# Patient Record
Sex: MALE | Race: WHITE
[De-identification: names, ages, dates, MRNs, and addresses within clinical notes are randomized per-mention and may not be internally consistent; named-entity substitution may affect disease eponyms.]

---

## 2020-03-02 ENCOUNTER — HOSPITAL ENCOUNTER (INPATIENT)
Dept: HOSPITAL 74 - FM/S | Age: 65
LOS: 2 days | Discharge: HOME HEALTH | DRG: 470 | End: 2020-03-04
Attending: ORTHOPAEDIC SURGERY | Admitting: ORTHOPAEDIC SURGERY
Payer: COMMERCIAL

## 2020-03-02 VITALS — BODY MASS INDEX: 36.9 KG/M2

## 2020-03-02 DIAGNOSIS — I10: ICD-10-CM

## 2020-03-02 DIAGNOSIS — R26.2: ICD-10-CM

## 2020-03-02 DIAGNOSIS — M17.11: Primary | ICD-10-CM

## 2020-03-02 PROCEDURE — 8E0Y0CZ ROBOTIC ASSISTED PROCEDURE OF LOWER EXTREMITY, OPEN APPROACH: ICD-10-PCS | Performed by: ORTHOPAEDIC SURGERY

## 2020-03-02 PROCEDURE — 0SRC0JA REPLACEMENT OF RIGHT KNEE JOINT WITH SYNTHETIC SUBSTITUTE, UNCEMENTED, OPEN APPROACH: ICD-10-PCS | Performed by: ORTHOPAEDIC SURGERY

## 2020-03-02 RX ADMIN — DOCUSATE SODIUM,SENNOSIDES SCH TABLET: 50; 8.6 TABLET, FILM COATED ORAL at 21:13

## 2020-03-02 RX ADMIN — OXYCODONE HYDROCHLORIDE AND ACETAMINOPHEN SCH MG: 500 TABLET ORAL at 21:13

## 2020-03-02 RX ADMIN — GABAPENTIN SCH MG: 300 CAPSULE ORAL at 21:13

## 2020-03-02 RX ADMIN — CEFAZOLIN SODIUM SCH MLS/HR: 2 SOLUTION INTRAVENOUS at 20:05

## 2020-03-02 RX ADMIN — CELECOXIB SCH MG: 200 CAPSULE ORAL at 21:13

## 2020-03-02 NOTE — HP
Admitting History and Physical





- Admission


Chief Complaint: right knee osteoarthritis x years


History of Present Illness: 


64 year old male presents in regard to their right knee. Long-standing history 

of right knee osteoarthritis. Patient complains of pain, limited range of motion

, difficulty ambulating, and difficulty with activities of daily living. 

Patient has failed conservative treatment options including PO medications, 

activity modification, injections, and exercise programs. At this point, 

patient like to proceed with surgical intervention, right total knee 

arthroplasty MAKOplasty. 


History Source: Patient





- Past Medical History


Cardiovascular: Yes: HTN


Musculoskeletal: Yes: Osteoarthritis





- Past Surgical History


Additional Past Surgical History: 


See written history & physical. 








- Smoking History


Smoking history: Never smoked


Have you smoked in the past 12 months: No





- Alcohol/Substance Use


Hx Alcohol Use: Yes (SOCIAL)





Home Medications





- Allergies


Allergies/Adverse Reactions: 


 Allergies











Allergy/AdvReac Type Severity Reaction Status Date / Time


 


No Known Allergies Allergy   Verified 02/24/20 16:49














- Home Medications


Home Medications: 


Ambulatory Orders





Fish Oil 1,000 mg Capsule 1 tab PO DAILY 02/24/20 


Spironolactone 25 mg PO DAILY 02/24/20 


Valsartan 320 mg PO DAILY 02/24/20 











Review of Systems





- Review of Systems


Musculoskeletal: reports: Crepitus (right knee), Decreased ROM (right knee), 

Joint Pain (right knee), Joint Swelling (right knee)





Physical Examination


Constitutional: Yes: Well Nourished


Eyes: Yes: Conjunctiva Clear


HENT: Yes: Atraumatic


Neck: Yes: Supple


Cardiovascular: Yes: Regular Rate and Rhythm


Respiratory: Yes: Regular


Gastrointestinal: Yes: Soft


...Rectal Exam: Yes: Deferred


Musculoskeletal: Yes: Joint Stiffness (right knee), Joint Swelling (right knee)





Assessment/Plan


64 year male old presents in regard to their right knee. Long-standing history 

of right knee osteoarthritis. Patient complains of pain, limited range of motion

, difficulty ambulating, and difficulty with activities of daily living. 

Patient has failed conservative treatment options including PO medications, 

activity modification, injections, and exercise programs. At this point, 

patient like to proceed with surgical intervention, right total knee 

arthroplasty MAKOplasty. Pros, cons, risks, benefits, and alternatives of a 

right total knee arthroplasty MAKOplasty were discussed with the patient at 

length. Patient confirms their understanding and consents to proceed with a 

right total knee arthroplasty MAKOplasty.

## 2020-03-02 NOTE — OP
Operative Note





- Note:


Operative Date: 03/02/20


Pre-Operative Diagnosis: right knee OA


Operation: right ASHLEY TKA


Post-Operative Diagnosis: Same as Pre-op


Surgeon: Wilmer Metz


Assistant: Lin Monahan


Anesthesia: Spinal


Estimated Blood Loss (mls): 200

## 2020-03-03 LAB
ANION GAP SERPL CALC-SCNC: 9 MMOL/L (ref 8–16)
BUN SERPL-MCNC: 23 MG/DL (ref 7–18)
CALCIUM SERPL-MCNC: 8.6 MG/DL (ref 8.5–10)
CHLORIDE SERPL-SCNC: 100 MMOL/L (ref 98–107)
CO2 SERPL-SCNC: 25 MMOL/L (ref 21–32)
CREAT SERPL-MCNC: 1.2 MG/DL (ref 0.55–1.3)
DEPRECATED RDW RBC AUTO: 13 % (ref 11.9–15.9)
GLUCOSE SERPL-MCNC: 179 MG/DL (ref 74–106)
HCT VFR BLD CALC: 45.1 % (ref 35.4–49)
HGB BLD-MCNC: 14.8 GM/DL (ref 11.7–16.9)
MCH RBC QN AUTO: 30.4 PG (ref 25.7–33.7)
MCHC RBC AUTO-ENTMCNC: 32.9 G/DL (ref 32–35.9)
MCV RBC: 92.4 FL (ref 80–96)
PLATELET # BLD AUTO: 215 K/MM3 (ref 134–434)
PMV BLD: 10.6 FL (ref 7.5–11.1)
POTASSIUM SERPLBLD-SCNC: 3.8 MMOL/L (ref 3.5–5.1)
RBC # BLD AUTO: 4.88 M/MM3 (ref 4–5.6)
SODIUM SERPL-SCNC: 134 MMOL/L (ref 136–145)
WBC # BLD AUTO: 16.2 K/MM3 (ref 4–10)

## 2020-03-03 RX ADMIN — KETOROLAC TROMETHAMINE SCH MG: 30 INJECTION INTRAMUSCULAR; INTRAVENOUS at 12:10

## 2020-03-03 RX ADMIN — PANTOPRAZOLE SODIUM SCH MG: 40 TABLET, DELAYED RELEASE ORAL at 10:34

## 2020-03-03 RX ADMIN — ACETAMINOPHEN SCH MG: 325 TABLET ORAL at 18:20

## 2020-03-03 RX ADMIN — MULTIVITAMIN TABLET SCH TAB: TABLET at 10:34

## 2020-03-03 RX ADMIN — ACETAMINOPHEN SCH MG: 325 TABLET ORAL at 06:31

## 2020-03-03 RX ADMIN — KETOROLAC TROMETHAMINE SCH MG: 30 INJECTION INTRAMUSCULAR; INTRAVENOUS at 06:32

## 2020-03-03 RX ADMIN — DOCUSATE SODIUM,SENNOSIDES SCH TABLET: 50; 8.6 TABLET, FILM COATED ORAL at 10:34

## 2020-03-03 RX ADMIN — KETOROLAC TROMETHAMINE SCH MG: 30 INJECTION INTRAMUSCULAR; INTRAVENOUS at 18:25

## 2020-03-03 RX ADMIN — ACETAMINOPHEN SCH MG: 325 TABLET ORAL at 12:05

## 2020-03-03 RX ADMIN — OXYCODONE HYDROCHLORIDE AND ACETAMINOPHEN SCH MG: 500 TABLET ORAL at 10:35

## 2020-03-03 RX ADMIN — CELECOXIB SCH MG: 200 CAPSULE ORAL at 21:17

## 2020-03-03 RX ADMIN — CELECOXIB SCH MG: 200 CAPSULE ORAL at 10:34

## 2020-03-03 RX ADMIN — CEFAZOLIN SODIUM SCH MLS/HR: 2 SOLUTION INTRAVENOUS at 03:09

## 2020-03-03 RX ADMIN — GABAPENTIN SCH MG: 300 CAPSULE ORAL at 10:34

## 2020-03-03 RX ADMIN — DOCUSATE SODIUM,SENNOSIDES SCH TABLET: 50; 8.6 TABLET, FILM COATED ORAL at 21:17

## 2020-03-03 RX ADMIN — ACETAMINOPHEN SCH MG: 325 TABLET ORAL at 00:48

## 2020-03-03 RX ADMIN — GABAPENTIN SCH MG: 300 CAPSULE ORAL at 21:17

## 2020-03-03 RX ADMIN — KETOROLAC TROMETHAMINE SCH MG: 30 INJECTION INTRAMUSCULAR; INTRAVENOUS at 00:47

## 2020-03-03 RX ADMIN — OXYCODONE HYDROCHLORIDE AND ACETAMINOPHEN SCH MG: 500 TABLET ORAL at 21:17

## 2020-03-03 RX ADMIN — ASPIRIN 325 MG ORAL TABLET SCH MG: 325 PILL ORAL at 08:05

## 2020-03-03 NOTE — PN
Progress Note (short form)





- Note


Progress Note: 





Pt seen and examined.  Doing well.





AVSS





                                Selected Entries











  03/03/20





  14:17


 


Temperature 97.4 F L


 


Respiratory 19





Rate 


 


Blood Pressure 126/77


 


O2 Sat by Pulse 98





Oximetry (%) 








                                Laboratory Tests











  03/03/20 03/03/20





  06:45 06:45


 


WBC  16.2 H 


 


Hgb  14.8 


 


Hct  45.1 


 


Plt Count  215 


 


Sodium   134 L


 


Potassium   3.8


 


Chloride   100


 


Carbon Dioxide   25


 


Anion Gap   9


 


BUN   23.0 H


 


Creatinine   1.2


 


Est GFR (CKD-EPI)AfAm   74.66


 


Est GFR (CKD-EPI)NonAf   64.42


 


Random Glucose   179 H


 


Calcium   8.6











Gen: NAD


RLE: c/d/i, NVID





A/P POD#1 s/p R ASHLEY HUNTLEY





PT/OOB





D/C home in AM

## 2020-03-03 NOTE — SURG
Surgery First Assist Note


First Assist: Lin Monahan PA-C


Date of Service: 03/02/20


Diagnosis: 


right knee osteoarthritis


Procedure: 





right total knee arthroplasty, Makoplasty


I was present for the entirety of the operative procedure. For further detail, 

please refer to operative report.

## 2020-03-03 NOTE — DS
Physical Examination


Vital Signs: 


                                   Vital Signs











Temperature  97.4 F L  03/03/20 14:17


 


Pulse Rate  80   03/03/20 04:00


 


Respiratory Rate  19   03/03/20 14:17


 


Blood Pressure  126/77   03/03/20 14:17


 


O2 Sat by Pulse Oximetry (%)  98   03/03/20 14:17











Labs: 


                                    CBC, BMP





                                 03/03/20 06:45 





                                 03/03/20 06:45 











Discharge Summary


Problems reviewed: Yes


Reason For Visit: RIGHT KNEE OSTEOARTHRITIS


Current Active Problems





Osteoarthritis of right knee (Acute)








Procedures: Principal: right ASHLEY TKA


Hospital Course: 


Admitted for elective surgery. Procedure performed without complications. 


Pt received postoperative antibiotic prophylaxis and DVT ppx.


Ambulated with physical therapy. Stable for discharge home with outpatient 

followup.


Condition: Stable





- Instructions


Diet, Activity, Other Instructions: 


Dr. Metz - Knee Replacement Instructions





Keep the Aquacel dressing on until removed by Dr. Metz in the office - 


it is antibacterial and waterproof and you can shower with it on.





Call the office for a follow-up appointment with Dr. Metz in 2 weeks.  [phone]





Take one Aspirin 325mg daily for 6 weeks to prevent blood clots in your legs.





Take one Pantoprazole 40mg daily for 6 weeks to protect against heartburn and 

ulcers.





Take Cephalexin (antibiotic) 3x/day for 10 days to help prevent skin infection.





Take Celebrex 200mg twice daily for 30 days to reduce swelling and inflammation.





Take a multivitamin, stool softener, and extra Vitamin C supplement daily.





For pain:





*Mild pain (1-3/10):





Take 1 Tramadol tablet every 4 hours as needed.





**Moderate pain (4-6/10):





Take 1 Tramadol tablet and 1 Percocet tablet every 4 hours as needed.





*** Severe pain (7-10/10):





Take 1 Tramadol tablet and 2 Percocet tablets every 4 hours as needed.





Activity: 


You can put as much weight on the operative leg as you want.





Right after you get home, there will be a physical therapist coming to your 


house to help you walk around and bend/straighten your knee.


 


After your follow-up appointment, you will be sent for more intensive outpatient




physical therapy which will include machines and equipment that the home 

therapist 


cannot bring to your house.





Always use a walker or cane for balance and to prevent falls.





Expect to see swelling/bruising from the operative site all the way down to your

toes. 


 


Wear the compression stocking on the operative side during the day to minimize 


how much swelling there is in your foot/ankle.





Don't wear the stocking at night.  You don't have to wear a stocking on the 

other side.


Disposition: VNS/HOME HEALTH CARE





- Home Medications


Comprehensive Discharge Medication List: 


Ambulatory Orders





Omega-3 Fatty Acids/Fish Oil [Fish Oil 1,000 mg Capsule] 1 each PO DAILY 

02/24/20 


Spironolactone 25 mg PO DAILY 02/24/20 


Valsartan 320 mg PO DAILY 02/24/20 


Ascorbic Acid [Vitamin C -] 500 mg PO BID  tablet 03/03/20 


Aspirin [ASA -] 325 mg PO DAILY@0800  tablet 03/03/20 


Celecoxib [CeleBREX -] 200 mg PO BID #60 capsule 03/03/20 


Cephalexin Monohydrate [Keflex -] 500 mg PO TID #30 capsule 03/03/20 


Multivitamins [Multivit ([facility] Formulary)] 1 tab PO DAILY  tab 03/03/20 


Oxycodone HCl/Acetaminophen [Percocet 5-325 mg Tablet] 1 - 2 tab PO Q4H PRN #60 

tablet MDD 10 03/03/20 


Pantoprazole Sodium [Protonix -] 40 mg PO DAILY #40 tablet.ec 03/03/20 


Sennosides/Docusate Sodium [Pericolace -] 2 tablet PO BID  tablet 03/03/20 


traMADol HCL [Ultram -] 50 mg PO Q4H PRN #42 tablet MDD 6 03/03/20

## 2020-03-03 NOTE — PN
Progress Note (short form)





- Note


Progress Note: 





Anesthesia postop note





65 y/o M s/p spinal anesthesia and regional block for pain management, for TKR


POD#1, vss, aaox3, pain well controlled, no complaints, had PT today.


No anesthesia complications.

## 2020-03-04 VITALS — TEMPERATURE: 97.6 F | DIASTOLIC BLOOD PRESSURE: 66 MMHG | SYSTOLIC BLOOD PRESSURE: 136 MMHG | HEART RATE: 82 BPM

## 2020-03-04 LAB
DEPRECATED RDW RBC AUTO: 12.4 % (ref 11.9–15.9)
HCT VFR BLD CALC: 37.7 % (ref 35.4–49)
HGB BLD-MCNC: 12.6 GM/DL (ref 11.7–16.9)
MCH RBC QN AUTO: 30.9 PG (ref 25.7–33.7)
MCHC RBC AUTO-ENTMCNC: 33.6 G/DL (ref 32–35.9)
MCV RBC: 92 FL (ref 80–96)
PLATELET # BLD AUTO: 159 K/MM3 (ref 134–434)
PMV BLD: 10.1 FL (ref 7.5–11.1)
RBC # BLD AUTO: 4.1 M/MM3 (ref 4–5.6)
WBC # BLD AUTO: 15.8 K/MM3 (ref 4–10.8)

## 2020-03-04 RX ADMIN — SODIUM CHLORIDE, POTASSIUM CHLORIDE, SODIUM LACTATE AND CALCIUM CHLORIDE SCH: 600; 310; 30; 20 INJECTION, SOLUTION INTRAVENOUS at 07:51

## 2020-03-04 RX ADMIN — DOCUSATE SODIUM,SENNOSIDES SCH TABLET: 50; 8.6 TABLET, FILM COATED ORAL at 09:12

## 2020-03-04 RX ADMIN — GABAPENTIN SCH MG: 300 CAPSULE ORAL at 09:11

## 2020-03-04 RX ADMIN — ACETAMINOPHEN SCH MG: 325 TABLET ORAL at 06:15

## 2020-03-04 RX ADMIN — CELECOXIB SCH MG: 200 CAPSULE ORAL at 09:11

## 2020-03-04 RX ADMIN — ASPIRIN 325 MG ORAL TABLET SCH MG: 325 PILL ORAL at 08:16

## 2020-03-04 RX ADMIN — OXYCODONE HYDROCHLORIDE AND ACETAMINOPHEN SCH MG: 500 TABLET ORAL at 09:11

## 2020-03-04 RX ADMIN — ACETAMINOPHEN SCH MG: 325 TABLET ORAL at 00:04

## 2020-03-04 RX ADMIN — PANTOPRAZOLE SODIUM SCH MG: 40 TABLET, DELAYED RELEASE ORAL at 09:11

## 2020-03-04 RX ADMIN — MULTIVITAMIN TABLET SCH TAB: TABLET at 09:11

## 2020-03-04 NOTE — SPEC
DATE OF OPERATION:  03/02/2020

 

PREOPERATIVE DIAGNOSIS:  Right knee osteoarthritis.  

 

POSTOPERATIVE DIAGNOSIS:  Right knee osteoarthritis.  

 

PROCEDURE:  Right total knee replacement with MAKOplasty robotic navigation.  

 

ATTENDING:  Iram Lomax MD

 

ASSISTANT:  SHIVAM Rivera

 

ANESTHESIA:  Spinal plus sedation.  

 

ESTIMATED BLOOD LOSS:  200 mL 

 

COMPLICATIONS:  None.

 

DISPOSITION:  The patient was transferred to the PACU in stable condition.  

 

IMPLANTS USED:  [employer] cementless knee components size 5 femur, size 6

tibia, 35 mm patellar component, 9 mm posterior stabilized polyethylene component.  

 

INDICATIONS:  This is a 64-year-old male who presented to the office complaining of

severe right knee pain.  He was seen and examined by Dr. Lomax and diagnosed with

severe right knee osteoarthritis.  The patient was initially treated non-operatively

with injections, medications and physical therapy, but continued to have severe pain

and ambulatory dysfunction.  He was therefore indicated for a right total knee

replacement.  The risks, benefits and alternatives to the procedure were explained to

the patient in great detail and he elected to proceed with the surgery.  

 

DESCRIPTION OF THE PROCEDURE:  On the day of surgery, the patient was taken to the

operating room and placed on the OR table.  Spinal anesthesia was administered by the

anesthesiologist.  The patient was then positioned supine on the table and all bony

prominences were padded.  The knee was then prepped and draped in the usual sterile

fashion and intravenous antibiotics were given for infection prophylaxis.  A surgical

time-out was then performed with the team, and the patients identity, procedure,

side, availability of implants, and the administration of antibiotics was confirmed. 

 

With the knee flexed, a midline incision was made and carried down through the

subcutaneous fat to the underlying retinaculum.  A medial parapatellar arthrotomy was

performed.  This was followed by a subperiosteal dissection of the tissue off the

proximal, medial tibia.  A portion of fat pad was removed from under the patellar

tendon, and a small portion of fat was excised off the distal supracondylar femur. 

Electrocautery and an Aquamantys bipolar sealing device were used to achieve

hemostasis.  The knee was then flexed further and the anterior horn of the lateral

meniscus was released from the midline.  Next, the anterior and posterior cruciate

ligaments were transected.  Grade 4 changes were noted diffusely throughout the knee.

 

Femoral and tibial checkpoints were then placed in the appropriate location using a

mallet.  Two parallel bicortical self-drilling pins were placed in the tibial

diaphysis after making stab incisions and bluntly dissecting down to bone.  Two pins

were then placed in the distal supracondylar femur.  The [employer] navigation arrays were

then attached to both the femoral and tibial pins and the lower extremity was then

registered to the robotic navigation device using various joint movements, as well as

inputting several dozen reference points.  The knee was then taken through a full

range of motion with a corrective force applied.  Alignment in varus/valgus as well

as flexion/extension and soft tissue balance was measured in various positions.  The

navigation device showed a numerical and graphic representation of the soft tissue

balance.  The components were repositioned virtually using the software until optimal

soft tissue balance was achieved on screen.  Once this was accomplished, the final

plan was saved and sent to the robot.

 

Self-retaining retractors were then placed at the joint line for exposure and

protection of the collateral ligaments.  The robot was brought into the sterile field

and registered with the navigation device.  The robotic arm with attached oscillating

saw blade was then used to perform femoral and tibial bone cuts as per the saved

software plan.  The femoral box cut was made using the appropriately sized manual

cutting guide.  The knee was then irrigated.  Trial components were placed and the

knee was taken through a full range of motion to assess soft tissue balance and

alignment.  The range of motion was found to be excellent and the soft tissue balance

was optimal and according to plan.

 

The knee was then put into extension and the patella everted.  The synovium around

the patella was circumscribed with electrocautery.  A caliper was used to measure the

patellar thickness and a saw was then used to resect the patella at the

chondro-osseous junction.  The cut surface was then sized and drilled for the

appropriate patellar button, with care taken to medialize it.  A trial patella was

then placed and the knee was again taken through a full range of motion.  The knee

was found to have both good balance and good patellar tracking.  All of the

components were removed except the tibial base plate.  The appropriate

instrumentation was used to drill and punch the proximal tibia for the keel of the

final component.

 

All bony surfaces were then cleaned with pulsatile lavage and dried.  Cementless

[employer] knee replacement components were then impacted in place and found

to have a stable press-fit.  A trial polyethylene component was placed and the knee

was again taken through a full range of motion to assess stability, balance, and

patellar tracking.  This was found to be optimal and the trial polyethylene was

exchanged for the appropriately sized real implant.

 

The wound was then thoroughly irrigated with normal saline.  A 3-minute dilute

Betadine lavage was performed.  The knee was again irrigated using a pulsatile lavage

device.  A periarticular injection was used to locally infiltrate the capsular

tissues surrounding the implant and prosthesis.  Then No. 1 Polysorb and 0 VLoc 180

barbed sutures were used to close the arthrotomy.  Then No. 1 Polysorb and 2-0 VLoc

90 sutures were used in the subcutaneous tissues.  Then 4-0 undyed Vicryl and

Dermabond skin adhesive was used to close the stab incisions made for the navigation

pins.  The skin was closed using both 3-0 VLoc 90 suture in a running subcuticular

fashion and Dermabond skin adhesive.  Once this was completed a sterile Aquacel

dressing and compressive Ace-wrap was applied.  The patient was then awakened and

taken to the PACU in stable condition.  

 

 

IRAM LOMAX M.D.

 

[id number]

DD: 03/03/2020 19:35

DT: 03/04/2020 14:11

Job #:  [id number]

## 2020-03-05 NOTE — PATH
Surgical Pathology Report



Patient Name:  KAMLA WALL

Accession #:  

Med. Rec. #:  [id number]                                                        

   /Age/Gender:  1955 (Age: 64) / M

Account:  [id number]                                                          

             Location: [facility]

Taken:  3/2/2020

Received:  3/2/2020

Reported:  3/5/2020

Physicians:  Wilmer Metz M.D.

  



Specimen(s) Received

 RIGHT KNEE BONES 





Clinical History

Right knee osteoarthritis







Final Diagnosis

KNEE BONES, RIGHT, TOTAL KNEE REPLACEMENT:

DEGENERATIVE JOINT DISEASE.





***Electronically Signed***

Karen Coats M.D.





Gross Description

Received in formalin labeled "right knee bones," is a 13.5 x 10.5 x 2.0 cm

aggregate of multiple portions of bone and soft tissue. The tibial plateau

measures 7.8 x 5.1 x 1.4 cm. There are multiple areas of eburnation present,

measuring up to 3.0 cm in greatest dimension. The remaining articular surfaces

are tan-brown and focally granular. The underlying trabecular bone is yellow and

hard. Representative sections are submitted in one cassette, following

decalcification.

/3/4/2020



[facility]/3/4/2020